# Patient Record
Sex: FEMALE | Race: WHITE | NOT HISPANIC OR LATINO | Employment: STUDENT | URBAN - METROPOLITAN AREA
[De-identification: names, ages, dates, MRNs, and addresses within clinical notes are randomized per-mention and may not be internally consistent; named-entity substitution may affect disease eponyms.]

---

## 2021-02-04 ENCOUNTER — OFFICE VISIT (OUTPATIENT)
Dept: OBSTETRICS AND GYNECOLOGY | Facility: CLINIC | Age: 21
End: 2021-02-04

## 2021-02-04 VITALS
DIASTOLIC BLOOD PRESSURE: 60 MMHG | SYSTOLIC BLOOD PRESSURE: 100 MMHG | WEIGHT: 126 LBS | BODY MASS INDEX: 22.32 KG/M2 | HEIGHT: 63 IN

## 2021-02-04 DIAGNOSIS — Z11.3 SCREENING FOR STD (SEXUALLY TRANSMITTED DISEASE): ICD-10-CM

## 2021-02-04 DIAGNOSIS — Z30.41 ENCOUNTER FOR SURVEILLANCE OF CONTRACEPTIVE PILLS: ICD-10-CM

## 2021-02-04 DIAGNOSIS — Z01.419 WOMEN'S ANNUAL ROUTINE GYNECOLOGICAL EXAMINATION: Primary | ICD-10-CM

## 2021-02-04 PROCEDURE — 99385 PREV VISIT NEW AGE 18-39: CPT | Performed by: NURSE PRACTITIONER

## 2021-02-04 NOTE — PROGRESS NOTES
GYN Annual Exam     Chief Complaint   Patient presents with   • Gynecologic Exam     New patient annual exam.       HPI    Avery Hanks is a 21 y.o. female who presents for annual well woman exam.  She is not sexually active and has never been sexually active. She does not use tampons, states she almost passes out every time she attempts to place a tampon.  Currently using microgestin for contraception. She is happy with her contraception: Yes. Periods are regular every 28-30 days, lasting 4 days. Dysmenorrhea:none. Cyclic symptoms include none. No intermenstrual bleeding, spotting, or discharge. Performing SBE:completes    She was previously taking Taytulla, was changed due to insurance issues. She feels the last several periods have been unusually heavy. She will let me know if she decides to change.    This is my first time meeting Avery Hanks  She is new to our practice    OB History    No obstetric history on file.         LMP-1/21/2021  Last Pap-never  History of abnormal Pap smear: no  History of STD-denies  Family history of uterine, colon or ovarian cancer: no  Family history of breast cancer: no  Gardasil Vaccine: Did not receive    Past Medical History:   Diagnosis Date   • Patient denies medical problems        Past Surgical History:   Procedure Laterality Date   • WISDOM TOOTH EXTRACTION           Current Outpatient Medications:   •  Norethin Ace-Eth Estrad-FE (MICROGESTIN 24 FE PO), Take  by mouth., Disp: , Rfl:     No Known Allergies    Social History     Tobacco Use   • Smoking status: Never Smoker   Substance Use Topics   • Alcohol use: Not on file   • Drug use: Not on file       Family History   Problem Relation Age of Onset   • No Known Problems Father    • No Known Problems Mother        Review of Systems   Constitutional: Negative for chills, fatigue and fever.   Gastrointestinal: Negative for abdominal distention, abdominal pain, nausea and vomiting.   Genitourinary: Negative for breast  "discharge, breast lump, breast pain, dysuria, menstrual problem, pelvic pain, pelvic pressure, vaginal bleeding, vaginal discharge and vaginal pain.   Musculoskeletal: Negative for gait problem.   Skin: Negative for rash.   Neurological: Negative for dizziness and headache.   Hematological: Does not bruise/bleed easily.   Psychiatric/Behavioral: Negative for behavioral problems.       /60   Ht 160 cm (63\")   Wt 57.2 kg (126 lb)   LMP 01/21/2021   BMI 22.32 kg/m²     Physical Exam  Vitals signs and nursing note reviewed.   Constitutional:       Appearance: Normal appearance.   HENT:      Head: Normocephalic and atraumatic.   Eyes:      Pupils: Pupils are equal, round, and reactive to light.   Neck:      Musculoskeletal: Normal range of motion and neck supple. No muscular tenderness.   Cardiovascular:      Rate and Rhythm: Normal rate.   Pulmonary:      Effort: Pulmonary effort is normal.   Chest:      Breasts: Breasts are symmetrical.         Right: No swelling, bleeding, inverted nipple, mass, nipple discharge, skin change or tenderness.         Left: No swelling, bleeding, inverted nipple, mass, nipple discharge, skin change or tenderness.   Abdominal:      General: Abdomen is flat. There is no distension.      Palpations: Abdomen is soft. There is no mass.      Tenderness: There is no abdominal tenderness. There is no guarding.      Hernia: No hernia is present. There is no hernia in the left inguinal area or right inguinal area.   Genitourinary:     General: Normal vulva.      Exam position: Lithotomy position.      Pubic Area: No rash or pubic lice.       Labia:         Right: No rash, tenderness, lesion or injury.         Left: No rash, tenderness, lesion or injury.       Urethra: No prolapse, urethral pain, urethral swelling or urethral lesion.      Comments: Pt intolerant of nontraumatic introduction of vaginal speculum.   Musculoskeletal: Normal range of motion.   Lymphadenopathy:      Cervical: No " cervical adenopathy.      Upper Body:      Right upper body: No supraclavicular, axillary or pectoral adenopathy.      Left upper body: No supraclavicular, axillary or pectoral adenopathy.      Lower Body: No right inguinal adenopathy. No left inguinal adenopathy.   Skin:     General: Skin is warm and dry.   Neurological:      General: No focal deficit present.      Mental Status: She is alert and oriented to person, place, and time.      Cranial Nerves: No cranial nerve deficit.   Psychiatric:         Mood and Affect: Mood normal.         Behavior: Behavior normal.         Thought Content: Thought content normal.         Judgment: Judgment normal.       Assessment     1. Annual Gyn Exam  2. Contraception management  3. Screening for STD    Plan   1. Well woman exam: Intolerant of nontraumatic introduction of vaginal speculum. She became diaphoretic, dizzy, light headed, SOA, and pale. The procedure was stopped immediately. Symptoms resolved after several minutes of resting.   2. Pap collected No due to inability to tolerate exam. Encouraged to return in one year for pap smear or sooner if she becomes sexually active. Recommend MVI daily.    3. Contraception: currently using microgestin  4. STD: Enc condoms. Desires STD screen today- Yes. Urine  5. Smoking status: nonsmoker  6. Patient's Body mass index is 22.32 kg/m². BMI is within normal parameters. No follow-up required..  7.  Encouraged annual mammogram screening starting at age 40. Instructed on how to perform SBE. Encouraged breast health self awareness.  8. Encouraged 150 minutes of exercise per week if not medially contraindicated.   9. Encouraged gardasil series, she was provided with a pamphlet      Follow Up one year or PRN    Brianna Jack, APRN  2/4/2021  15:10 EST

## 2021-02-08 LAB
C TRACH RRNA SPEC QL NAA+PROBE: NEGATIVE
N GONORRHOEA RRNA SPEC QL NAA+PROBE: NEGATIVE
T VAGINALIS DNA SPEC QL NAA+PROBE: NEGATIVE

## 2024-04-10 ENCOUNTER — APPOINTMENT (OUTPATIENT)
Dept: CT IMAGING | Facility: HOSPITAL | Age: 24
End: 2024-04-10
Payer: COMMERCIAL

## 2024-04-10 ENCOUNTER — HOSPITAL ENCOUNTER (EMERGENCY)
Facility: HOSPITAL | Age: 24
Discharge: HOME | End: 2024-04-10
Attending: EMERGENCY MEDICINE | Admitting: EMERGENCY MEDICINE
Payer: COMMERCIAL

## 2024-04-10 VITALS
BODY MASS INDEX: 21.64 KG/M2 | RESPIRATION RATE: 18 BRPM | OXYGEN SATURATION: 99 % | WEIGHT: 122.14 LBS | SYSTOLIC BLOOD PRESSURE: 111 MMHG | TEMPERATURE: 98.1 F | HEIGHT: 63 IN | HEART RATE: 98 BPM | DIASTOLIC BLOOD PRESSURE: 78 MMHG

## 2024-04-10 DIAGNOSIS — J03.90 EXUDATIVE TONSILLITIS: Primary | ICD-10-CM

## 2024-04-10 LAB
ANION GAP SERPL CALCULATED.3IONS-SCNC: 12.1 MMOL/L (ref 5–15)
BASOPHILS # BLD AUTO: 0.04 10*3/MM3 (ref 0–0.2)
BASOPHILS NFR BLD AUTO: 0.3 % (ref 0–1.5)
BUN SERPL-MCNC: 9 MG/DL (ref 6–20)
BUN/CREAT SERPL: 14.5 (ref 7–25)
CALCIUM SPEC-SCNC: 8.5 MG/DL (ref 8.6–10.5)
CHLORIDE SERPL-SCNC: 103 MMOL/L (ref 98–107)
CO2 SERPL-SCNC: 20.9 MMOL/L (ref 22–29)
CREAT SERPL-MCNC: 0.62 MG/DL (ref 0.57–1)
DEPRECATED RDW RBC AUTO: 42.1 FL (ref 37–54)
EGFRCR SERPLBLD CKD-EPI 2021: 126.9 ML/MIN/1.73
EOSINOPHIL # BLD AUTO: 0.04 10*3/MM3 (ref 0–0.4)
EOSINOPHIL NFR BLD AUTO: 0.3 % (ref 0.3–6.2)
ERYTHROCYTE [DISTWIDTH] IN BLOOD BY AUTOMATED COUNT: 13.3 % (ref 12.3–15.4)
GLUCOSE SERPL-MCNC: 96 MG/DL (ref 65–99)
HCG SERPL QL: NEGATIVE
HCT VFR BLD AUTO: 42.1 % (ref 34–46.6)
HGB BLD-MCNC: 13.6 G/DL (ref 12–15.9)
HOLD SPECIMEN: NORMAL
IMM GRANULOCYTES # BLD AUTO: 0.05 10*3/MM3 (ref 0–0.05)
IMM GRANULOCYTES NFR BLD AUTO: 0.3 % (ref 0–0.5)
LYMPHOCYTES # BLD AUTO: 1.07 10*3/MM3 (ref 0.7–3.1)
LYMPHOCYTES NFR BLD AUTO: 7.5 % (ref 19.6–45.3)
MCH RBC QN AUTO: 28 PG (ref 26.6–33)
MCHC RBC AUTO-ENTMCNC: 32.3 G/DL (ref 31.5–35.7)
MCV RBC AUTO: 86.8 FL (ref 79–97)
MONOCYTES # BLD AUTO: 1.33 10*3/MM3 (ref 0.1–0.9)
MONOCYTES NFR BLD AUTO: 9.3 % (ref 5–12)
NEUTROPHILS NFR BLD AUTO: 11.78 10*3/MM3 (ref 1.7–7)
NEUTROPHILS NFR BLD AUTO: 82.3 % (ref 42.7–76)
NRBC BLD AUTO-RTO: 0 /100 WBC (ref 0–0.2)
PLATELET # BLD AUTO: 288 10*3/MM3 (ref 140–450)
PMV BLD AUTO: 10.5 FL (ref 6–12)
POTASSIUM SERPL-SCNC: 3.6 MMOL/L (ref 3.5–5.2)
RBC # BLD AUTO: 4.85 10*6/MM3 (ref 3.77–5.28)
SODIUM SERPL-SCNC: 136 MMOL/L (ref 136–145)
WBC NRBC COR # BLD AUTO: 14.31 10*3/MM3 (ref 3.4–10.8)

## 2024-04-10 PROCEDURE — 70491 CT SOFT TISSUE NECK W/DYE: CPT

## 2024-04-10 PROCEDURE — 25010000002 DEXAMETHASONE PER 1 MG: Performed by: EMERGENCY MEDICINE

## 2024-04-10 PROCEDURE — 84703 CHORIONIC GONADOTROPIN ASSAY: CPT | Performed by: EMERGENCY MEDICINE

## 2024-04-10 PROCEDURE — 99285 EMERGENCY DEPT VISIT HI MDM: CPT

## 2024-04-10 PROCEDURE — 25510000001 IOPAMIDOL 61 % SOLUTION: Performed by: EMERGENCY MEDICINE

## 2024-04-10 PROCEDURE — 85025 COMPLETE CBC W/AUTO DIFF WBC: CPT | Performed by: EMERGENCY MEDICINE

## 2024-04-10 PROCEDURE — 96374 THER/PROPH/DIAG INJ IV PUSH: CPT

## 2024-04-10 PROCEDURE — 80048 BASIC METABOLIC PNL TOTAL CA: CPT | Performed by: EMERGENCY MEDICINE

## 2024-04-10 RX ORDER — AMOXICILLIN AND CLAVULANATE POTASSIUM 875; 125 MG/1; MG/1
1 TABLET, FILM COATED ORAL ONCE
Status: COMPLETED | OUTPATIENT
Start: 2024-04-10 | End: 2024-04-10

## 2024-04-10 RX ORDER — AMOXICILLIN AND CLAVULANATE POTASSIUM 875; 125 MG/1; MG/1
1 TABLET, FILM COATED ORAL 2 TIMES DAILY
Qty: 20 TABLET | Refills: 0 | Status: SHIPPED | OUTPATIENT
Start: 2024-04-10 | End: 2024-04-20

## 2024-04-10 RX ORDER — SODIUM CHLORIDE 0.9 % (FLUSH) 0.9 %
10 SYRINGE (ML) INJECTION AS NEEDED
Status: DISCONTINUED | OUTPATIENT
Start: 2024-04-10 | End: 2024-04-10 | Stop reason: HOSPADM

## 2024-04-10 RX ORDER — DEXAMETHASONE SODIUM PHOSPHATE 10 MG/ML
8 INJECTION INTRAMUSCULAR; INTRAVENOUS ONCE
Status: COMPLETED | OUTPATIENT
Start: 2024-04-10 | End: 2024-04-10

## 2024-04-10 RX ADMIN — IOPAMIDOL 85 ML: 612 INJECTION, SOLUTION INTRAVENOUS at 10:54

## 2024-04-10 RX ADMIN — DEXAMETHASONE SODIUM PHOSPHATE 8 MG: 10 INJECTION INTRAMUSCULAR; INTRAVENOUS at 10:02

## 2024-04-10 RX ADMIN — AMOXICILLIN AND CLAVULANATE POTASSIUM 1 TABLET: 875; 125 TABLET, FILM COATED ORAL at 13:33

## 2024-04-10 NOTE — ED PROVIDER NOTES
EMERGENCY DEPARTMENT ENCOUNTER  Room Number:  35/35  PCP: Provider, No Known  Independent Historians: Patient and Family      HPI:  Chief Complaint: had concerns including Sore Throat.     A complete HPI/ROS/PMH/PSH/SH/FH are unobtainable due to: None    Chronic or social conditions impacting patient care (Social Determinants of Health): None      Context: Avery Hanks is a 24 y.o. female with a medical history of wisdom tooth extraction who presents to the ED c/o acute sore throat.  The patient reports since Sunday she has had a sore throat.  She reports she went to the urgent care center 2 days ago and they swabbed her for strep and COVID.  She states it was negative.  She reports she has had difficulty swallowing which has gotten worse and her symptoms have become severe so she went to the urgent care again today and they sent her here for further evaluation.  She denies any measured fevers.  She does report she has felt warm.      Review of prior external notes (non-ED) -and- Review of prior external test results outside of this encounter:  Laboratory evaluation dated today shows negative mono, negative strep.  Laboratory evaluation 4/8/2024 showed negative strep and negative COVID and flu    Prescription drug monitoring program review:         PAST MEDICAL HISTORY  Active Ambulatory Problems     Diagnosis Date Noted    No Active Ambulatory Problems     Resolved Ambulatory Problems     Diagnosis Date Noted    No Resolved Ambulatory Problems     Past Medical History:   Diagnosis Date    Patient denies medical problems          PAST SURGICAL HISTORY  Past Surgical History:   Procedure Laterality Date    WISDOM TOOTH EXTRACTION           FAMILY HISTORY  Family History   Problem Relation Age of Onset    No Known Problems Father     No Known Problems Mother          SOCIAL HISTORY  Social History     Socioeconomic History    Marital status: Single   Tobacco Use    Smoking status: Never         ALLERGIES  Patient  has no known allergies.      REVIEW OF SYSTEMS  Review of Systems  Included in HPI  All systems reviewed and negative except for those discussed in HPI.      PHYSICAL EXAM    I have reviewed the triage vital signs and nursing notes.    ED Triage Vitals   Temp Heart Rate Resp BP SpO2   04/10/24 0927 04/10/24 0927 04/10/24 0927 04/10/24 1004 04/10/24 0927   98.1 °F (36.7 °C) 113 18 111/78 98 %      Temp src Heart Rate Source Patient Position BP Location FiO2 (%)   04/10/24 0927 04/10/24 0927 04/10/24 1004 04/10/24 1004 --   Tympanic Monitor Sitting Right arm        Physical Exam  GENERAL: Awake, alert, no acute distress  SKIN: Warm, dry  HENT: Normocephalic, atraumatic.  No hoarse voice.  No stridor or drooling.  No respiratory distress.  She does have kissing tonsils.  No uvula deviation.  No pharyngeal asymmetry.  There is exudates on bilateral tonsils.  EYES: no scleral icterus  CV: regular rhythm, regular rate  RESPIRATORY: normal effort, lungs clear  ABDOMEN: soft, nontender, nondistended  MUSCULOSKELETAL: no deformity  NEURO: alert, moves all extremities, follows commands            LAB RESULTS  Recent Results (from the past 24 hour(s))   POCT INFECTIOUS MONONUCLEOSIS ANTIBODY    Collection Time: 04/10/24  8:40 AM    Specimen type and source: Blood specimen from patient (specimen)   Result Value Ref Range    Monospot Negative Negative    QC Yes     Lot Number [id number]     Expiration Date 01/31/2025     Comment     hCG, Serum, Qualitative    Collection Time: 04/10/24  9:59 AM    Specimen: Blood   Result Value Ref Range    HCG Qualitative Negative Negative   CBC Auto Differential    Collection Time: 04/10/24  9:59 AM    Specimen: Blood   Result Value Ref Range    WBC 14.31 (H) 3.40 - 10.80 10*3/mm3    RBC 4.85 3.77 - 5.28 10*6/mm3    Hemoglobin 13.6 12.0 - 15.9 g/dL    Hematocrit 42.1 34.0 - 46.6 %    MCV 86.8 79.0 - 97.0 fL    MCH 28.0 26.6 - 33.0 pg    MCHC 32.3 31.5 - 35.7 g/dL    RDW 13.3 12.3 - 15.4 %     RDW-SD 42.1 37.0 - 54.0 fl    MPV 10.5 6.0 - 12.0 fL    Platelets 288 140 - 450 10*3/mm3    Neutrophil % 82.3 (H) 42.7 - 76.0 %    Lymphocyte % 7.5 (L) 19.6 - 45.3 %    Monocyte % 9.3 5.0 - 12.0 %    Eosinophil % 0.3 0.3 - 6.2 %    Basophil % 0.3 0.0 - 1.5 %    Immature Grans % 0.3 0.0 - 0.5 %    Neutrophils, Absolute 11.78 (H) 1.70 - 7.00 10*3/mm3    Lymphocytes, Absolute 1.07 0.70 - 3.10 10*3/mm3    Monocytes, Absolute 1.33 (H) 0.10 - 0.90 10*3/mm3    Eosinophils, Absolute 0.04 0.00 - 0.40 10*3/mm3    Basophils, Absolute 0.04 0.00 - 0.20 10*3/mm3    Immature Grans, Absolute 0.05 0.00 - 0.05 10*3/mm3    nRBC 0.0 0.0 - 0.2 /100 WBC   Gold Top - SST    Collection Time: 04/10/24 10:06 AM   Result Value Ref Range    Extra Tube Hold for add-ons.    Basic Metabolic Panel    Collection Time: 04/10/24 10:33 AM    Specimen: Blood   Result Value Ref Range    Glucose 96 65 - 99 mg/dL    BUN 9 6 - 20 mg/dL    Creatinine 0.62 0.57 - 1.00 mg/dL    Sodium 136 136 - 145 mmol/L    Potassium 3.6 3.5 - 5.2 mmol/L    Chloride 103 98 - 107 mmol/L    CO2 20.9 (L) 22.0 - 29.0 mmol/L    Calcium 8.5 (L) 8.6 - 10.5 mg/dL    BUN/Creatinine Ratio 14.5 7.0 - 25.0    Anion Gap 12.1 5.0 - 15.0 mmol/L    eGFR 126.9 >60.0 mL/min/1.73         RADIOLOGY  CT Soft Tissue Neck With Contrast    Result Date: 4/10/2024  CT SCAN OF THE SOFT TISSUES OF THE NECK WITH CONTRAST  CLINICAL HISTORY: Sore throat and swollen tonsils.  TECHNIQUE: CT scan of the soft tissues of neck was obtained with 3 mm axial images following the administration of IV contrast. Sagittal and coronal reconstructed images were obtained.  FINDINGS:  The palatine tonsils are enlarged bilaterally. There are ill-defined hypodense areas within both palatine tonsils. The abnormality on the right measures up to approximately 1.3 x 0.8 cm whereas the abnormality on the left measures up to approximately 1.0 x 0.8 cm in greatest axial dimensions. The findings are compatible with either early or  developing tonsillar/peritonsillar abscesses. There is mild edematous change appreciated within the lateral walls of the oropharynx as well as the right posterolateral wall of the oropharynx. The walls of the right piriform sinus are also edematous. There is edema seen within the fat planes posterior to the submandibular glands bilaterally. A small amount of retropharyngeal fluid is appreciated. There are mildly enlarged lymph nodes that are predominantly seen within level 2 bilaterally. The largest of these is a right level 2A node measuring up to approximately 1.7 x 0.9 cm in greatest axial dimensions.  Otherwise, the visualized contents of the cranial vault are within normal limits. The orbits are unremarkable. The parotid glands, submandibular glands, and sublingual glands are within normal limits. The nasopharynx, cervical esophagus, and visualized upper thoracic esophagus are all unremarkable in appearance. The epiglottis, subglottic airway, trachea, and mainstem bronchi are unremarkable in appearance. The thyroid gland is within normal limits. The visualized lung apices are clear.       There are ill-defined hypodense abnormalities within both palatine tonsils. The abnormality within the right palatine tonsil measures up to 1.3 x 0.8 cm in greatest axial dimensions whereas the abnormality within the left palatine tonsil measures up to 1.0 x 0.8 cm. The findings are compatible with developing or early tonsillar/peritonsillar abscesses. Mild edematous changes are appreciated within the lateral walls of the oropharynx as well as the right posterolateral wall of the oropharynx. This edematous change extends into the right piriform sinus. A small amount of fluid is seen within the retropharynx. Reactive lymph nodes are noted bilaterally that are predominantly seen at level 2.  These findings were directly discussed with Dr. Pedrito Mendez on 4/10/2024 at approximately 12:25 p.m.  Radiation dose reduction techniques  were utilized, including automated exposure control and exposure modulation based on body size.  This report was finalized on 4/10/2024 12:58 PM by Dr. Rich Kessler M.D on Workstation: [id number]         MEDICATIONS GIVEN IN ER  Medications   dexAMETHasone (DECADRON) injection 8 mg (8 mg Intravenous Given 4/10/24 1002)   iopamidol (ISOVUE-300) 61 % injection 85 mL (85 mL Intravenous Given by Other 4/10/24 1054)   amoxicillin-clavulanate (AUGMENTIN) 875-125 MG per tablet 1 tablet (1 tablet Oral Given 4/10/24 1333)         ORDERS PLACED DURING THIS VISIT:  Orders Placed This Encounter   Procedures    CT Soft Tissue Neck With Contrast    Basic Metabolic Panel    hCG, Serum, Qualitative    CBC Auto Differential    CBC & Differential    Extra Tubes    Gold Top - [facility]         OUTPATIENT MEDICATION MANAGEMENT:  No current Epic-ordered facility-administered medications on file.     Current Outpatient Medications Ordered in Epic   Medication Sig Dispense Refill    amoxicillin-clavulanate (AUGMENTIN) 875-125 MG per tablet Take 1 tablet by mouth 2 (Two) Times a Day for 10 days. 20 tablet 0    Norethin Ace-Eth Estrad-FE (MICROGESTIN 24 FE PO) Take  by mouth.           PROCEDURES  Procedures            PROGRESS, DATA ANALYSIS, CONSULTS, AND MEDICAL DECISION MAKING  All labs have been independently interpreted by me.  All radiology studies have been reviewed by me. All EKG's have been independently viewed and interpreted by me.  Discussion below represents my analysis of pertinent findings related to patient's condition, differential diagnosis, treatment plan and final disposition.    Differential diagnosis includes but is not limited to exudative pharyngitis, exudative tonsillitis, mono, strep, viral syndrome, tonsillar abscess, peritonsillar abscess, deep space neck infection.    Clinical Scores:                   ED Course as of 04/10/24 1548   Wed Apr 10, 2024   1019 WBC(!): 14.31 [TR]   1042 HCG Qualitative: Negative [TR]    1204 CT Soft Tissue Neck With Contrast  My independent interpretation of the imaging study is no discrete abscess [TR]   1214 Discussing with Dr. Kessler with radiology.  The patient has bilateral hypotensive regions in the tonsils consistent with early tonsillar versus peritonsillar fluid collections.  He states they are not quite abscesses at this point. [TR]   1215 I have a call out to ENT to discuss. [TR]   1254 I reviewed the workup and findings with the patient and family at the bedside.  Answered all questions.  She reports she has had symptomatic improvement with the steroids.  She does have a leukocytosis.  She has normal oxygenation.  She is not ill-appearing.  She appears comfortable.  I have a call out to ENT and will discuss management plan. [TR]   1315 Discussing with Dr. Monroe with ENT.  He is okay with the patient going home on Augmentin and following up in the office. [TR]   1317 Updated the patient and family.  They are agreeable to the plan of discharge with outpatient ENT follow-up. [TR]      ED Course User Index  [TR] Quincy Mendez MD             AS OF 15:48 EDT VITALS:    BP - 111/78  HR - 98  TEMP - 98.1 °F (36.7 °C) (Tympanic)  O2 SATS - 99%    COMPLEXITY OF CARE  Admission was considered but after careful review of the patient's presentation, physical examination, diagnostic results, and response to treatment the patient may be safely discharged with outpatient follow-up.      DIAGNOSIS  Final diagnoses:   Exudative tonsillitis         DISPOSITION  ED Disposition       ED Disposition   Discharge    Condition   Stable    Comment   --                Please note that portions of this document were completed with a voice recognition program.    Note Disclaimer: At [facility], we believe that sharing information builds trust and better relationships. You are receiving this note because you recently visited [facility]. It is possible you will see health information before a provider  has talked with you about it. This kind of information can be easy to misunderstand. To help you fully understand what it means for your health, we urge you to discuss this note with your provider.         Quincy Mendez MD  04/10/24 1545

## 2024-04-10 NOTE — DISCHARGE INSTRUCTIONS
Take antibiotics as prescribed.  Use Tylenol for any fever or discomfort.  Call Dr. Monroe for an appointment to be seen in the office.  Return immediately for any difficulty breathing or swallowing, uncontrolled fevers or shortness of breath.  The antibiotics can cause some diarrhea.  Eat yogurt or probiotics to help control the diarrhea.